# Patient Record
Sex: FEMALE | Race: WHITE | Employment: UNEMPLOYED | URBAN - METROPOLITAN AREA
[De-identification: names, ages, dates, MRNs, and addresses within clinical notes are randomized per-mention and may not be internally consistent; named-entity substitution may affect disease eponyms.]

---

## 2024-10-08 ENCOUNTER — HOSPITAL ENCOUNTER (OUTPATIENT)
Age: 24
Discharge: HOME | End: 2024-10-08
Payer: MEDICAID

## 2024-10-08 VITALS
OXYGEN SATURATION: 98 % | DIASTOLIC BLOOD PRESSURE: 72 MMHG | TEMPERATURE: 98 F | HEART RATE: 74 BPM | RESPIRATION RATE: 18 BRPM | SYSTOLIC BLOOD PRESSURE: 120 MMHG

## 2024-10-08 DIAGNOSIS — H02.844 SWELLING OF LEFT UPPER EYELID: Primary | ICD-10-CM

## 2024-10-08 PROCEDURE — 99213 OFFICE O/P EST LOW 20 MIN: CPT | Performed by: PHYSICIAN ASSISTANT

## 2024-10-08 RX ORDER — ERYTHROMYCIN 5 MG/G
1 OINTMENT OPHTHALMIC EVERY 6 HOURS
Qty: 1 G | Refills: 0 | Status: SHIPPED | OUTPATIENT
Start: 2024-10-08 | End: 2024-10-15

## 2024-10-08 RX ORDER — LORATADINE 10 MG/1
10 TABLET ORAL DAILY
Qty: 14 TABLET | Refills: 0 | Status: SHIPPED | OUTPATIENT
Start: 2024-10-08 | End: 2024-10-22

## 2024-10-08 NOTE — ED PROVIDER NOTES
Chief Complaint   Patient presents with    Eye Visual Problem       History obtained from: patient   services not used     HPI:     Natasha Meza is a 24 year old female who presents with left upper eyelid swelling x 3-4 days. Patient notes tenderness to touch. Patient states she used a new mascara. No swelling to right eyelid. Patient states she is otherwise feeling well. Denies eye injury/trauma, vision changes, eye drainage, pain or swelling around the eye, pain with eye movements, headaches, fevers, chills. Patient wears glasses, no contact lenses.     PMH  History reviewed. No pertinent past medical history.    PFSH    PFS asessment screens reviewed and agree.  Nurses notes reviewed I agree with documentation.    No family history on file.  Family history reviewed with patient/caregiver and is not pertinent to presenting problem.  Social History     Socioeconomic History    Marital status: Single     Spouse name: Not on file    Number of children: Not on file    Years of education: Not on file    Highest education level: Not on file   Occupational History    Not on file   Tobacco Use    Smoking status: Never    Smokeless tobacco: Never   Vaping Use    Vaping status: Never Used   Substance and Sexual Activity    Alcohol use: Never    Drug use: Yes     Types: Cannabis    Sexual activity: Not on file   Other Topics Concern    Not on file   Social History Narrative    Not on file     Social Determinants of Health     Financial Resource Strain: Not on file   Food Insecurity: No Food Insecurity (8/30/2023)    Received from [facility]    Food Insecurity     Within the past 30 days, I worried whether my food would run out before I got money to buy more. / En los últimos 30 días, me preocupó que la comida se podía acabar antes de tener dinero para compr...: Never true / [place]     Within the past 30 days, the food that I bought just didn't last, and I didn't have money to get more. / En  los últimos 30 días, La comida que compré no rindió lo suficiente, y no tenía dinero para...: Never true / [place]   Transportation Needs: Not on file   Physical Activity: Not on file   Stress: Not on file   Social Connections: Not on file   Housing Stability: Not on file         ROS:   Positive for stated complaint: left upper eyelid pain and swelling   All other systems reviewed and negative except as noted above.  Constitutional and Vital Signs Reviewed.    Physical Exam:     Findings:    /72   Pulse 74   Temp 97.9 °F (36.6 °C) (Oral)   Resp 18   LMP 09/07/2024 (Exact Date)   SpO2 98%   GENERAL: well developed, no acute distress, non-toxic appearing   SKIN: good skin turgor, no obvious rashes  HEAD: normocephalic, atraumatic  EYES: Edema and mild erythema localized to left upper eyelid, minimally tender to palpation, no obvious mass, no foreign body on lid eversion, right eyelid normal, sclera non-icteric bilaterally, conjunctiva clear bilaterally, PERRLA, EOMs intact without pain, no periorbital edema or erythema or tenderness, vision grossly intact  OROPHARYNX: MMM, maintaining airway and secretions  NECK: no nuchal rigidity, no trismus, no edema, phonation normal    CARDIO: regular rate   LUNGS: no increased WOB  EXTREMITIES: no cyanosis or edema, BIGGS without difficulty  NEURO: no focal deficits  PSYCH: alert and oriented x3, answering questions appropriately, mood appropriate    MDM/Assessment/Plan:   Orders for this encounter:    Orders Placed This Encounter    loratadine 10 MG Oral Tab     Sig: Take 1 tablet (10 mg total) by mouth daily for 14 days.     Dispense:  14 tablet     Refill:  0    erythromycin 5 MG/GM Ophthalmic Ointment     Sig: Apply 1 Application to eye every 6 (six) hours for 7 days.     Dispense:  1 g     Refill:  0       Labs performed this visit:  No results found for this or any previous visit (from the past 10 hour(s)).    Imaging performed this visit:  No orders to display        Medical Decision Making  DDx includes hordeolum versus chalazion versus blepharitis versus allergic reaction versus other.  Patient is overall very well-appearing with stable vitals.  No signs or symptoms of systemic illness.  No signs of cellulitis around the eye.  No conjunctival involvement. Discussed possible etiologies of left upper eyelid swelling and pain with patient.  Rx erythromycin antibiotic ointment.  Rx loratadine oral antihistamine for possible allergic component. Discussed supportive care including rest and warm compress to eye 2-3 times daily. Instructed patient to go directly to nearest ER immediately with any worsening or concerning symptoms. Follow up with ophthalmologist.     Risk  OTC drugs.  Prescription drug management.          Diagnosis:    ICD-10-CM    1. Swelling of left upper eyelid  H02.844           All results reviewed and discussed with patient/patient's family. Patient/patient's family verbalize excellent understanding of instructions and feels comfortable with plan. All of patient's/patient's family's questions were addressed.   See AVS for detailed discharge instructions for your condition today.    Follow Up with:  Abdiel Hansen MD  [address]  [address]  [place] [address] [phone]      Ophthalmology      Note: This document was dictated using Dragon medical dictation software.  Proofreading was performed to the best of my ability, but errors may be present.    Imelda Jarvis PA-C

## 2024-10-08 NOTE — DISCHARGE INSTRUCTIONS
Apply antibiotic eye ointment to eyelid as directed   Take daily antihistamine as directed   Apply clean, warm compress to eyes a few times daily as tolerated   Avoid touching or putting anything else in or near eyes   Wash your hands often     Follow up with opthalmology